# Patient Record
Sex: MALE | Race: WHITE
[De-identification: names, ages, dates, MRNs, and addresses within clinical notes are randomized per-mention and may not be internally consistent; named-entity substitution may affect disease eponyms.]

---

## 2022-09-05 ENCOUNTER — HOSPITAL ENCOUNTER (EMERGENCY)
Dept: HOSPITAL 26 - MED | Age: 52
LOS: 1 days | Discharge: HOME | End: 2022-09-06
Payer: COMMERCIAL

## 2022-09-05 VITALS
DIASTOLIC BLOOD PRESSURE: 75 MMHG | SYSTOLIC BLOOD PRESSURE: 117 MMHG | WEIGHT: 190 LBS | HEIGHT: 72 IN | BODY MASS INDEX: 25.73 KG/M2

## 2022-09-05 DIAGNOSIS — M54.50: Primary | ICD-10-CM

## 2022-09-05 DIAGNOSIS — Z79.899: ICD-10-CM

## 2022-09-05 PROCEDURE — 87086 URINE CULTURE/COLONY COUNT: CPT

## 2022-09-05 PROCEDURE — 81001 URINALYSIS AUTO W/SCOPE: CPT

## 2022-09-05 PROCEDURE — 96372 THER/PROPH/DIAG INJ SC/IM: CPT

## 2022-09-05 PROCEDURE — 72100 X-RAY EXAM L-S SPINE 2/3 VWS: CPT

## 2022-09-05 PROCEDURE — 99284 EMERGENCY DEPT VISIT MOD MDM: CPT

## 2022-09-05 NOTE — NUR
52YR OLD MALE BIB EMS C/O BACK PAIN. DENIES INJURY OR TRAUMA.  PT STATES HE WAS 
SMOKING CRACK AND STARTED NOT TO FEEL WELL AND WOKE UP WITH 10/10 PAIN LEVEL. 
PT STATES NOT HX OF BACK INJURIES OR PAIN.  PT IS IN GOWN AND HOB ELEVATED.  PT 
IS A&OX4 SKIN IS WARM AND DRY AND INTACT.  SIDE RAILS UPX1.  BED AT LOWEST 
POSITION.



NKDA

HIV POSITIVE

## 2022-09-06 VITALS — DIASTOLIC BLOOD PRESSURE: 54 MMHG | SYSTOLIC BLOOD PRESSURE: 90 MMHG

## 2022-09-06 LAB
APPEARANCE UR: (no result)
BILIRUB UR QL STRIP: NEGATIVE
COLOR UR: (no result)
GLUCOSE UR STRIP-MCNC: NEGATIVE MG/DL
HGB UR QL STRIP: NEGATIVE
LEUKOCYTE ESTERASE UR QL STRIP: NEGATIVE
NITRITE UR QL STRIP: POSITIVE
PH UR STRIP: 6 [PH] (ref 5–9)
RBC #/AREA URNS HPF: (no result) /HPF (ref 0–5)
WBC,URINE: (no result) /HPF (ref 0–5)

## 2022-09-06 NOTE — NUR
PATIENT IS UP TO DC.  DID ROAD TEST ON PATIENT, GAIT STEADY 8/10. GIVING 
PATIENT  TILL 5AM TO PHYSICALLY DC

## 2022-09-06 NOTE — NUR
Patient discharged with v/s stable. Written and verbal after care instructions 
given and explained. 

Patient alert, oriented and verbalized understanding of instructions. 
Ambulatory with steady gait. All questions addressed prior to discharge. ID 
band removed. Patient advised to follow up with PMD. Rx of FLEXERIL NAPROSYN 
given. Patient educated on indication of medication including possible reaction 
and side effects. Opportunity to ask questions provided and answered.

## 2022-09-10 NOTE — NUR
**LATE ENTRY** RECEIVED RESULTS FOR POSITIVE URINE CULTURE, PATIENT CONTACTED 
AND ADVISED TO RETURN FOR WORSENING SYMPTOMS, AS WELL MADE AWARE OF NEW RX SENT 
TO ON FILE [facility]ARAM[facility] PER DR. LOPEZ.